# Patient Record
Sex: FEMALE | Race: BLACK OR AFRICAN AMERICAN | NOT HISPANIC OR LATINO | Employment: PART TIME | URBAN - METROPOLITAN AREA
[De-identification: names, ages, dates, MRNs, and addresses within clinical notes are randomized per-mention and may not be internally consistent; named-entity substitution may affect disease eponyms.]

---

## 2017-07-16 PROCEDURE — 81025 URINE PREGNANCY TEST: CPT | Performed by: EMERGENCY MEDICINE

## 2017-07-16 PROCEDURE — 99284 EMERGENCY DEPT VISIT MOD MDM: CPT

## 2017-07-17 ENCOUNTER — HOSPITAL ENCOUNTER (EMERGENCY)
Facility: HOSPITAL | Age: 37
Discharge: HOME | End: 2017-07-17
Attending: EMERGENCY MEDICINE
Payer: MEDICAID

## 2017-07-17 VITALS
WEIGHT: 175 LBS | SYSTOLIC BLOOD PRESSURE: 119 MMHG | HEIGHT: 64 IN | TEMPERATURE: 98 F | BODY MASS INDEX: 29.88 KG/M2 | RESPIRATION RATE: 18 BRPM | DIASTOLIC BLOOD PRESSURE: 78 MMHG | OXYGEN SATURATION: 100 % | HEART RATE: 86 BPM

## 2017-07-17 DIAGNOSIS — R07.89 CHEST PAIN, NON-CARDIAC: Primary | ICD-10-CM

## 2017-07-17 DIAGNOSIS — R91.8 LUNG MASS: ICD-10-CM

## 2017-07-17 DIAGNOSIS — R07.9 CHEST PAIN: ICD-10-CM

## 2017-07-17 LAB
B-HCG UR QL: NEGATIVE
CTP QC/QA: YES

## 2017-07-17 PROCEDURE — 93005 ELECTROCARDIOGRAM TRACING: CPT

## 2017-07-17 RX ORDER — METHOCARBAMOL 500 MG/1
1000 TABLET, FILM COATED ORAL 3 TIMES DAILY
Qty: 30 TABLET | Refills: 0 | Status: SHIPPED | OUTPATIENT
Start: 2017-07-17 | End: 2017-07-22

## 2017-07-17 RX ORDER — PREDNISONE 20 MG/1
60 TABLET ORAL DAILY
Qty: 15 TABLET | Refills: 0 | Status: SHIPPED | OUTPATIENT
Start: 2017-07-17 | End: 2017-07-22

## 2017-07-17 RX ORDER — METHOCARBAMOL 500 MG/1
1000 TABLET, FILM COATED ORAL 3 TIMES DAILY
Qty: 30 TABLET | Refills: 0 | Status: SHIPPED | OUTPATIENT
Start: 2017-07-17 | End: 2017-07-17

## 2017-07-17 NOTE — ED TRIAGE NOTES
Pt reports to ED complaining of pain to left breast with reports of a lump. Upon assessment, no lump palpitated. No SOB noted.

## 2017-07-17 NOTE — DISCHARGE INSTRUCTIONS
Please follow-up with your primary care doctor for a breast examination and follow-up for your lung mass.  Return immediately if you get worse or if new problems develop.  Ibuprofen for pain.  Robaxin for pain.

## 2017-07-17 NOTE — ED PROVIDER NOTES
"Encounter Date: 7/16/2017    SCRIBE #1 NOTE: I, Maoscarchata Michele, am scribing for, and in the presence of,  Harvey Gann MD. I have scribed the following portions of the note - Other sections scribed: ROS, HPI.       History     Chief Complaint   Patient presents with    Chest Pain     "I just been having chest pains or whatever. And I raise my arms up, it feels like I'm pulling something."      CC: Chest Pain    HPI: Patient is a 36 y.o. F with a past medical history of Immune deficiency disorder who presents to the ED         The history is provided by the patient. No  was used.     This 36-year-old female presents to the emergency room complaining of left lateral pectoral chest pain that is well localized to a small location that has been present for "quite some time", but worse over the course the last 2 weeks.  The patient's pain is worse when she lifts her arms over her head.  It is also worse when she takes a deep breath.  The patient has no shortness of breath there is no coughing.  She may feel a breast mass in the left breast.  She is essentially otherwise well without other injuries or problems.  There is no precordial chest pressure or tightness.  Review of patient's allergies indicates:   Allergen Reactions    Diclofenac      palpitation     Past Medical History:   Diagnosis Date    Immune deficiency disorder      Past Surgical History:   Procedure Laterality Date    SPLENECTOMY, TOTAL       No family history on file.  Social History   Substance Use Topics    Smoking status: Never Smoker    Smokeless tobacco: Never Used    Alcohol use 3.0 oz/week     5 Glasses of wine per week     Review of Systems  The patient was questioned specifically with regard to the following.  General: Fever, chills, sweats. Neuro: Headache. Eyes: eye problems. ENT: Ear pain, sore throat. Cardiovascular: Chest pain. Respiratory: Cough, shortness of breath. Gastrointestinal: Abdominal pain, vomiting, " diarrhea. Genitourinary: Painful urination.  Musculoskeletal: Arm and leg problems. Skin: Rash.  The review of systems was negative except for the following: Chest pain, breast mass,  Physical Exam     Initial Vitals [07/16/17 2240]   BP Pulse Resp Temp SpO2   122/67 94 18 98.6 °F (37 °C) 98 %      MAP       85.33         Physical Exam  The patient was examined specifically for the following:   General:No significant distress, Good color, Warm and dry. Head and neck:Scalp atraumatic, Neck supple. Neurological:Appropriate conversation, Gross motor deficits. Eyes:Conjugate gaze, Clear corneas. ENT: No epistaxis. Cardiac: Regular rate and rhythm, Grossly normal heart tones. Pulmonary: Wheezing, Rales. Gastrointestinal: Abdominal tenderness, Abdominal distention. Musculoskeletal: Extremity deformity, Apparent pain with range of motion of the joints. Skin: Rash.   The findings on examination were normal except for the following: The patient has tenderness of the left pectoral chest in the anterior axillary line at about the third rib.  Palpation there reproduces the pain of chief complaint.  Posterior distraction left arm causes the patient to have acute exacerbation of the pain of the chief complaint.  Similar maneuvers on the right side.  There is no symptoms.  I could not feel any breast masses.  I cannot feel any breast discharge.  ED Course   Procedures  Labs Reviewed   POCT URINE PREGNANCY     EKG Readings: (Independently Interpreted)   The patient's EKG reveals sinus tachycardia with a heart rate of 101.  The WA QRS and QT intervals are normal.  There is no evidence of acute myocardial infarction or malignant arrhythmia.       X-Rays:   Independently Interpreted Readings:   Other Readings:  Chest x-ray reveals a nodular density that will require follow-up.  There is no infiltrate pneumothorax pneumonia pleural effusion.          Medical decision making: Given the above this patient presents with left lateral  pectoral chest pain sharp and worse with deep breathing, raising the arms over the head.  The patient is not short of breath.  She did have a tachycardia with a heart rate of 101.  During the physical examination her heart rate was 95.  Her heart rate at the time of discharge is 85.  There is no leg swelling or tenderness.  I doubt pulmonary embolus.  Chest x-ray fails to reveal pneumothorax pneumonia pleural effusion EKG fails reveal pericarditis or myocardial infarction.  I will refer this patient outpatient follow-up.  Patient was also concerned about the possibility of a breast mass.  I could not feel one.          Scribe Attestation:   Scribe #1: I performed the above scribed service and the documentation accurately describes the services I performed. I attest to the accuracy of the note.    Attending Attestation:           Physician Attestation for Scribe:  Physician Attestation Statement for Scribe #1: I, Harvey Gann MD, reviewed documentation, as scribed by Sindy Michele in my presence, and it is both accurate and complete.                 ED Course     Clinical Impression:   The primary encounter diagnosis was Chest pain, non-cardiac. Diagnoses of Chest pain and Lung mass were also pertinent to this visit.                           Harvey Gann MD  07/17/17 0132       Harvey Gann MD  07/17/17 0143

## 2017-07-17 NOTE — ED PROVIDER NOTES
"Encounter Date: 7/16/2017    SCRIBE #1 NOTE: I, Sindy Michele, am scribing for, and in the presence of,  Harvey Gann MD. I have scribed the following portions of the note - Other sections scribed: ROS, HPI.       History     Chief Complaint   Patient presents with    Chest Pain     "I just been having chest pains or whatever. And I raise my arms up, it feels like I'm pulling something."      CC: Chest Pain    HPI: Patient is a 36 y.o. F with a past medical history of Immune deficiency disorder.        The history is provided by the patient. No  was used.     Review of patient's allergies indicates:   Allergen Reactions    Diclofenac      palpitation     Past Medical History:   Diagnosis Date    Immune deficiency disorder      Past Surgical History:   Procedure Laterality Date    SPLENECTOMY, TOTAL       History reviewed. No pertinent family history.  Social History   Substance Use Topics    Smoking status: Never Smoker    Smokeless tobacco: Never Used    Alcohol use 3.0 oz/week     5 Glasses of wine per week     Review of Systems    Physical Exam     Initial Vitals [07/16/17 2240]   BP Pulse Resp Temp SpO2   122/67 94 18 98.6 °F (37 °C) 98 %      MAP       85.33         Physical Exam    ED Course   Procedures  Labs Reviewed   POCT URINE PREGNANCY                               ED Course     Clinical Impression:   The primary encounter diagnosis was Chest pain, non-cardiac. Diagnoses of Chest pain and Lung mass were also pertinent to this visit.                        "

## 2017-07-30 ENCOUNTER — HOSPITAL ENCOUNTER (EMERGENCY)
Facility: HOSPITAL | Age: 37
Discharge: HOME | End: 2017-07-30
Payer: MEDICAID

## 2017-07-30 VITALS
DIASTOLIC BLOOD PRESSURE: 71 MMHG | TEMPERATURE: 98 F | SYSTOLIC BLOOD PRESSURE: 114 MMHG | HEART RATE: 107 BPM | WEIGHT: 175 LBS | BODY MASS INDEX: 29.88 KG/M2 | RESPIRATION RATE: 18 BRPM | OXYGEN SATURATION: 100 % | HEIGHT: 64 IN

## 2017-07-30 DIAGNOSIS — R07.89 CHEST WALL PAIN: Primary | ICD-10-CM

## 2017-07-30 DIAGNOSIS — D64.9 ANEMIA, UNSPECIFIED TYPE: ICD-10-CM

## 2017-07-30 DIAGNOSIS — R07.9 CHEST PAIN: ICD-10-CM

## 2017-07-30 LAB
ALBUMIN SERPL BCP-MCNC: 3.4 G/DL
ALP SERPL-CCNC: 60 U/L
ALT SERPL W/O P-5'-P-CCNC: 13 U/L
ANION GAP SERPL CALC-SCNC: 7 MMOL/L
AST SERPL-CCNC: 15 U/L
B-HCG UR QL: NEGATIVE
BASOPHILS # BLD AUTO: 0.03 K/UL
BASOPHILS NFR BLD: 0.2 %
BILIRUB SERPL-MCNC: 1.1 MG/DL
BUN SERPL-MCNC: 8 MG/DL
CALCIUM SERPL-MCNC: 8.2 MG/DL
CHLORIDE SERPL-SCNC: 108 MMOL/L
CO2 SERPL-SCNC: 23 MMOL/L
CREAT SERPL-MCNC: 0.7 MG/DL
CTP QC/QA: YES
DIFFERENTIAL METHOD: ABNORMAL
EOSINOPHIL # BLD AUTO: 0.1 K/UL
EOSINOPHIL NFR BLD: 0.8 %
ERYTHROCYTE [DISTWIDTH] IN BLOOD BY AUTOMATED COUNT: 13.8 %
EST. GFR  (AFRICAN AMERICAN): >60 ML/MIN/1.73 M^2
EST. GFR  (NON AFRICAN AMERICAN): >60 ML/MIN/1.73 M^2
GLUCOSE SERPL-MCNC: 82 MG/DL
HCT VFR BLD AUTO: 33.1 %
HGB BLD-MCNC: 10.8 G/DL
LYMPHOCYTES # BLD AUTO: 1.3 K/UL
LYMPHOCYTES NFR BLD: 10.2 %
MCH RBC QN AUTO: 29.1 PG
MCHC RBC AUTO-ENTMCNC: 32.6 G/DL
MCV RBC AUTO: 89 FL
MONOCYTES # BLD AUTO: 1.2 K/UL
MONOCYTES NFR BLD: 9.3 %
NEUTROPHILS # BLD AUTO: 10.2 K/UL
NEUTROPHILS NFR BLD: 79.5 %
PLATELET # BLD AUTO: 450 K/UL
PMV BLD AUTO: 9.3 FL
POTASSIUM SERPL-SCNC: 3.8 MMOL/L
PROT SERPL-MCNC: 6.7 G/DL
RBC # BLD AUTO: 3.71 M/UL
SODIUM SERPL-SCNC: 138 MMOL/L
WBC # BLD AUTO: 12.79 K/UL

## 2017-07-30 PROCEDURE — 96360 HYDRATION IV INFUSION INIT: CPT

## 2017-07-30 PROCEDURE — 80053 COMPREHEN METABOLIC PANEL: CPT

## 2017-07-30 PROCEDURE — 81025 URINE PREGNANCY TEST: CPT | Performed by: NURSE PRACTITIONER

## 2017-07-30 PROCEDURE — 93005 ELECTROCARDIOGRAM TRACING: CPT

## 2017-07-30 PROCEDURE — 85025 COMPLETE CBC W/AUTO DIFF WBC: CPT

## 2017-07-30 PROCEDURE — 99284 EMERGENCY DEPT VISIT MOD MDM: CPT | Mod: 25

## 2017-07-30 PROCEDURE — 25000003 PHARM REV CODE 250: Performed by: NURSE PRACTITIONER

## 2017-07-30 PROCEDURE — 25500020 PHARM REV CODE 255: Performed by: NURSE PRACTITIONER

## 2017-07-30 RX ORDER — IBUPROFEN 800 MG/1
800 TABLET ORAL EVERY 8 HOURS PRN
Qty: 30 TABLET | Refills: 0 | Status: SHIPPED | OUTPATIENT
Start: 2017-07-30

## 2017-07-30 RX ORDER — FERROUS SULFATE 325(65) MG
325 TABLET ORAL 2 TIMES DAILY
Qty: 30 TABLET | Refills: 0 | Status: SHIPPED | OUTPATIENT
Start: 2017-07-30

## 2017-07-30 RX ADMIN — IOHEXOL 70 ML: 350 INJECTION, SOLUTION INTRAVENOUS at 01:07

## 2017-07-30 RX ADMIN — SODIUM CHLORIDE 1000 ML: 0.9 INJECTION, SOLUTION INTRAVENOUS at 12:07
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
